# Patient Record
Sex: FEMALE | Race: WHITE | NOT HISPANIC OR LATINO | Employment: FULL TIME | URBAN - METROPOLITAN AREA
[De-identification: names, ages, dates, MRNs, and addresses within clinical notes are randomized per-mention and may not be internally consistent; named-entity substitution may affect disease eponyms.]

---

## 2022-07-28 PROCEDURE — 87086 URINE CULTURE/COLONY COUNT: CPT | Performed by: FAMILY MEDICINE

## 2023-07-19 PROBLEM — M62.81 MUSCLE WEAKNESS: Status: ACTIVE | Noted: 2020-01-17

## 2023-07-19 PROBLEM — R29.3 ABNORMAL POSTURE: Status: ACTIVE | Noted: 2020-01-17

## 2023-07-19 PROBLEM — M47.816 LUMBAR SPONDYLOSIS: Status: ACTIVE | Noted: 2020-01-17

## 2023-07-19 PROBLEM — M54.50 LOW BACK PAIN: Status: ACTIVE | Noted: 2020-01-17

## 2023-07-19 NOTE — H&P (VIEW-ONLY)
New Patient Consultation     Patient Name: Gladis Noriega  : 1980   MRN: [id number]     Chief Complaint:    Chief Complaint   Patient presents with    ERCP CONSULT       History of Present Illness: Gladis Noriega is a 42 y.o. female who is here today with past medical history of obesity, hypothyroidism, hypertension, lumbar spondylosis for a Gastroenterology Consultation for obstructed bile duct    Had MRCP on 23-common bile duct dilation with strong suggestion of stone at the lower end with ERCP recommended.     She denies any abdominal pain. No jaundice.      There are elevated liver enzymes with fatty liver seen on imaging which she reports have improved on re-check.     Her abdominal surgical hx includes fernando (16 years ago) and 2 c sections  Subjective      Review of Systems:   Review of Systems   Constitutional:  Negative for appetite change and unexpected weight loss.   HENT:  Negative for trouble swallowing.    Gastrointestinal:  Negative for abdominal distention, abdominal pain, anal bleeding, blood in stool, constipation, diarrhea, nausea, rectal pain, vomiting, GERD and indigestion.     Past Medical History:   Past Medical History:   Diagnosis Date    Disease of thyroid gland     Hyperlipidemia     Hypertension        Past Surgical History:   Past Surgical History:   Procedure Laterality Date     SECTION      GALLBLADDER SURGERY         Family History: History reviewed. No pertinent family history.    Social History:   Social History     Socioeconomic History    Marital status:    Tobacco Use    Smoking status: Never    Smokeless tobacco: Never   Vaping Use    Vaping Use: Never used   Substance and Sexual Activity    Alcohol use: Never    Drug use: Defer    Sexual activity: Defer       Alcohol/Tobacco History:   Social History     Substance and Sexual Activity   Alcohol Use Never     Social History     Tobacco Use   Smoking Status Never   Smokeless Tobacco Never        Medications:     Current Outpatient Medications:     fluticasone (FLONASE) 50 MCG/ACT nasal spray, Flonase Allergy Relief 50 mcg/actuation nasal spray,suspension  2 squirts each nostril Daily, Disp: , Rfl:     folic acid (FOLVITE) 0.5 MG half tablet, folic acid  2mg daily, Disp: , Rfl:     folic acid (FOLVITE) 1 MG tablet, Take 2 tablets by mouth Daily., Disp: 180 tablet, Rfl: 3    folic acid (FOLVITE) 1 MG tablet, Take 2 tablets by mouth daily, Disp: 180 tablet, Rfl: 3    hydrOXYzine (ATARAX) 25 MG tablet, Every 8 (Eight) Hours., Disp: , Rfl:     levothyroxine (Synthroid) 175 MCG tablet, Take 1 tablet by mouth Daily., Disp: 90 tablet, Rfl: 0    levothyroxine (Synthroid) 175 MCG tablet, Take 1 tablet by mouth Daily., Disp: 90 tablet, Rfl: 1    levothyroxine (Synthroid) 175 MCG tablet, Take 1 tablet by mouth daily., Disp: 90 tablet, Rfl: 1    losartan-hydrochlorothiazide (HYZAAR) 100-25 MG per tablet, losartan 100 mg-hydrochlorothiazide 25 mg tablet  TAKE 1 TABLET DAILY, Disp: , Rfl:     losartan-hydrochlorothiazide (HYZAAR) 100-25 MG per tablet, Take 1 tablet by mouth Daily., Disp: 90 tablet, Rfl: 1    losartan-hydrochlorothiazide (HYZAAR) 100-25 MG per tablet, Take 1 tablet by mouth Daily., Disp: 90 tablet, Rfl: 1    losartan-hydrochlorothiazide (HYZAAR) 100-25 MG per tablet, Take 1 tablet by mouth Daily., Disp: 90 tablet, Rfl: 1    medroxyPROGESTERone (PROVERA) 10 MG tablet, medroxyprogesterone 10 mg tablet  Take 1 tabled daily on days 1-10 of the month every 3 months, Disp: , Rfl:     rosuvastatin (CRESTOR) 20 MG tablet, Take 1 tablet by mouth Daily., Disp: 90 tablet, Rfl: 0    rosuvastatin (CRESTOR) 20 MG tablet, Take 1 tablet by mouth Daily., Disp: 90 tablet, Rfl: 1    Semaglutide-Weight Management (Wegovy) 0.25 MG/0.5ML solution auto-injector, Inject 0.25 mg under the skin into the appropriate area as directed once weekly, Disp: 2 mL, Rfl: 0    Semaglutide-Weight Management (Wegovy) 0.5 MG/0.5ML solution  "auto-injector, Inject 0.5 mL (0.5mg) under the skin into the appropriate area as directed Every 7 (Seven) Days., Disp: 2 mL, Rfl: 0    Semaglutide-Weight Management (Wegovy) 1 MG/0.5ML solution auto-injector, Inject 0.5 mL under the skin into the appropriate area as directed 1 (One) Time Per Week., Disp: 2 mL, Rfl: 0    Topiramate ER (Trokendi XR) 200 MG capsule sustained-release 24 hr, Take  by mouth., Disp: , Rfl:     Topiramate ER (Trokendi XR) 200 MG capsule sustained-release 24 hr, Take 1 capsule by mouth Every Evening at bedtime., Disp: 90 capsule, Rfl: 0    Topiramate ER (Trokendi XR) 200 MG capsule sustained-release 24 hr, Take 1 capsule by mouth at bedtime., Disp: 90 capsule, Rfl: 0    Topiramate ER (Trokendi XR) 200 MG capsule sustained-release 24 hr, Take 1 capsule by mouth every night at bedtime, Disp: 90 capsule, Rfl: 3    Topiramate  MG capsule sustained-release 24 hr, Take 1 capsule by mouth every night at bedtime., Disp: 90 capsule, Rfl: 3    methylPREDNISolone (Medrol) 4 MG dose pack, Follow package directions. (Patient not taking: Reported on 7/19/2023), Disp: 21 each, Rfl: 0    nitrofurantoin, macrocrystal-monohydrate, (MACROBID) 100 MG capsule, Take 1 capsule by mouth 2 (Two) Times a Day. (Patient not taking: Reported on 7/19/2023), Disp: 10 capsule, Rfl: 0    rosuvastatin (CRESTOR) 10 MG tablet, rosuvastatin 10 mg tablet  Take 1 tablet every day by oral route for 90 days. (Patient not taking: Reported on 7/19/2023), Disp: , Rfl:     Allergies:   Allergies   Allergen Reactions    Codeine Nausea Only    Lisinopril Cough       Objective     Physical Exam:  Vital Signs:   Vitals:    07/19/23 1139   BP: 124/76   BP Location: Left arm   Patient Position: Sitting   Cuff Size: Large Adult   Pulse: 77   Temp: 98 øF (36.7 øC)   TempSrc: Temporal   SpO2: 99%   Weight: 103 kg (227 lb 6.4 oz)   Height: 162.6 cm (64\")     Body mass index is 39.03 kg/mý.     Physical Exam  Vitals and nursing note " reviewed.   Constitutional:       General: She is not in acute distress.     Appearance: She is well-developed. She is not diaphoretic.   Eyes:      General: No scleral icterus.     Conjunctiva/sclera: Conjunctivae normal.   Neck:      Thyroid: No thyromegaly.   Cardiovascular:      Rate and Rhythm: Normal rate and regular rhythm.   Pulmonary:      Effort: Pulmonary effort is normal.      Breath sounds: Normal breath sounds.   Abdominal:      General: Bowel sounds are normal. There is no distension.      Palpations: Abdomen is soft.      Tenderness: There is no abdominal tenderness. There is no guarding or rebound.      Hernia: No hernia is present.   Musculoskeletal:      Cervical back: Neck supple.      Right lower leg: No edema.      Left lower leg: No edema.   Skin:     General: Skin is warm and dry.      Capillary Refill: Capillary refill takes 2 to 3 seconds.      Coloration: Skin is not jaundiced or pale.      Findings: No bruising or petechiae.      Nails: There is no clubbing.   Neurological:      Mental Status: She is alert and oriented to person, place, and time.   Psychiatric:         Behavior: Behavior normal.         Thought Content: Thought content normal.         Judgment: Judgment normal.     Reviewed from prior office note, labs, imaging  Assessment / Plan      Assessment/Plan:   Diagnoses and all orders for this visit:    1. Bile duct obstruction (Primary)  -     Protime-INR; Future  -     Comprehensive Metabolic Panel; Future  -     CBC & Differential; Future  -     Ambulatory referral for Screening EGD  Recommend ERCP.  Procedure discussed, discussed risk of pancreatitis.  2. Fatty liver  -     Protime-INR; Future  -     Comprehensive Metabolic Panel; Future  -     CBC & Differential; Future  Recommend heart healthy diet and regular exercise.  Discussed benefits of black coffee       Follow Up:   Return if symptoms worsen or fail to improve.    Plan of care reviewed with the patient at the  conclusion of today's visit.  Education was provided regarding diagnosis, management, and any prescribed or recommended OTC medications.  Patient verbalized understanding of and agreement with management plan.         LEROY Cool  [facility]

## 2023-07-20 ENCOUNTER — PREP FOR SURGERY (OUTPATIENT)
Dept: OTHER | Facility: HOSPITAL | Age: 43
End: 2023-07-20

## 2023-07-20 DIAGNOSIS — K83.1 BILE DUCT OBSTRUCTION: Primary | ICD-10-CM

## 2023-07-21 PROBLEM — K83.1 BILE DUCT OBSTRUCTION: Status: ACTIVE | Noted: 2023-07-21

## 2023-07-28 ENCOUNTER — PRE-ADMISSION TESTING (OUTPATIENT)
Dept: PREADMISSION TESTING | Facility: HOSPITAL | Age: 43
End: 2023-07-28
Payer: COMMERCIAL

## 2023-07-28 VITALS — WEIGHT: 232.25 LBS | HEIGHT: 64 IN | BODY MASS INDEX: 39.65 KG/M2

## 2023-07-28 DIAGNOSIS — K76.0 FATTY LIVER: ICD-10-CM

## 2023-07-28 DIAGNOSIS — K83.1 BILE DUCT OBSTRUCTION: ICD-10-CM

## 2023-07-28 LAB
ALBUMIN SERPL-MCNC: 4.9 G/DL (ref 3.5–5.2)
ALBUMIN/GLOB SERPL: 1.6 G/DL
ALP SERPL-CCNC: 68 U/L (ref 39–117)
ALT SERPL W P-5'-P-CCNC: 20 U/L (ref 1–33)
ANION GAP SERPL CALCULATED.3IONS-SCNC: 13 MMOL/L (ref 5–15)
AST SERPL-CCNC: 17 U/L (ref 1–32)
BASOPHILS # BLD AUTO: 0.06 10*3/MM3 (ref 0–0.2)
BASOPHILS NFR BLD AUTO: 0.7 % (ref 0–1.5)
BILIRUB SERPL-MCNC: 0.3 MG/DL (ref 0–1.2)
BUN SERPL-MCNC: 14 MG/DL (ref 6–20)
BUN/CREAT SERPL: 24.1 (ref 7–25)
CALCIUM SPEC-SCNC: 9.8 MG/DL (ref 8.6–10.5)
CHLORIDE SERPL-SCNC: 102 MMOL/L (ref 98–107)
CO2 SERPL-SCNC: 24 MMOL/L (ref 22–29)
CREAT SERPL-MCNC: 0.58 MG/DL (ref 0.57–1)
DEPRECATED RDW RBC AUTO: 47.5 FL (ref 37–54)
EGFRCR SERPLBLD CKD-EPI 2021: 114.6 ML/MIN/1.73
EOSINOPHIL # BLD AUTO: 0.32 10*3/MM3 (ref 0–0.4)
EOSINOPHIL NFR BLD AUTO: 3.7 % (ref 0.3–6.2)
ERYTHROCYTE [DISTWIDTH] IN BLOOD BY AUTOMATED COUNT: 13.5 % (ref 12.3–15.4)
GLOBULIN UR ELPH-MCNC: 3 GM/DL
GLUCOSE SERPL-MCNC: 93 MG/DL (ref 65–99)
HCT VFR BLD AUTO: 41.1 % (ref 34–46.6)
HGB BLD-MCNC: 13 G/DL (ref 12–15.9)
IMM GRANULOCYTES # BLD AUTO: 0.05 10*3/MM3 (ref 0–0.05)
IMM GRANULOCYTES NFR BLD AUTO: 0.6 % (ref 0–0.5)
INR PPP: 1.01 (ref 0.89–1.12)
LYMPHOCYTES # BLD AUTO: 2.8 10*3/MM3 (ref 0.7–3.1)
LYMPHOCYTES NFR BLD AUTO: 32 % (ref 19.6–45.3)
MCH RBC QN AUTO: 30.2 PG (ref 26.6–33)
MCHC RBC AUTO-ENTMCNC: 31.6 G/DL (ref 31.5–35.7)
MCV RBC AUTO: 95.6 FL (ref 79–97)
MONOCYTES # BLD AUTO: 0.72 10*3/MM3 (ref 0.1–0.9)
MONOCYTES NFR BLD AUTO: 8.2 % (ref 5–12)
NEUTROPHILS NFR BLD AUTO: 4.81 10*3/MM3 (ref 1.7–7)
NEUTROPHILS NFR BLD AUTO: 54.8 % (ref 42.7–76)
NRBC BLD AUTO-RTO: 0 /100 WBC (ref 0–0.2)
PLATELET # BLD AUTO: 407 10*3/MM3 (ref 140–450)
PMV BLD AUTO: 9.4 FL (ref 6–12)
POTASSIUM SERPL-SCNC: 3.7 MMOL/L (ref 3.5–5.2)
PROT SERPL-MCNC: 7.9 G/DL (ref 6–8.5)
PROTHROMBIN TIME: 13.4 SECONDS (ref 12.2–14.5)
RBC # BLD AUTO: 4.3 10*6/MM3 (ref 3.77–5.28)
SODIUM SERPL-SCNC: 139 MMOL/L (ref 136–145)
WBC NRBC COR # BLD: 8.76 10*3/MM3 (ref 3.4–10.8)

## 2023-07-28 PROCEDURE — 93005 ELECTROCARDIOGRAM TRACING: CPT

## 2023-07-28 PROCEDURE — 36415 COLL VENOUS BLD VENIPUNCTURE: CPT

## 2023-07-28 PROCEDURE — 85610 PROTHROMBIN TIME: CPT

## 2023-07-28 PROCEDURE — 80053 COMPREHEN METABOLIC PANEL: CPT

## 2023-07-28 PROCEDURE — 85025 COMPLETE CBC W/AUTO DIFF WBC: CPT

## 2023-07-28 RX ORDER — LORATADINE 10 MG/1
1 CAPSULE, LIQUID FILLED ORAL DAILY
COMMUNITY

## 2023-07-31 ENCOUNTER — ANESTHESIA EVENT (OUTPATIENT)
Dept: GASTROENTEROLOGY | Facility: HOSPITAL | Age: 43
End: 2023-07-31
Payer: COMMERCIAL

## 2023-07-31 LAB
QT INTERVAL: 412 MS
QTC INTERVAL: 412 MS

## 2023-08-01 ENCOUNTER — HOSPITAL ENCOUNTER (OUTPATIENT)
Facility: HOSPITAL | Age: 43
Setting detail: HOSPITAL OUTPATIENT SURGERY
Discharge: HOME | End: 2023-08-01
Attending: INTERNAL MEDICINE | Admitting: INTERNAL MEDICINE
Payer: COMMERCIAL

## 2023-08-01 ENCOUNTER — APPOINTMENT (OUTPATIENT)
Dept: GENERAL RADIOLOGY | Facility: HOSPITAL | Age: 43
End: 2023-08-01
Payer: COMMERCIAL

## 2023-08-01 ENCOUNTER — ANESTHESIA (OUTPATIENT)
Dept: GASTROENTEROLOGY | Facility: HOSPITAL | Age: 43
End: 2023-08-01
Payer: COMMERCIAL

## 2023-08-01 VITALS
SYSTOLIC BLOOD PRESSURE: 134 MMHG | OXYGEN SATURATION: 99 % | TEMPERATURE: 97.2 F | RESPIRATION RATE: 16 BRPM | DIASTOLIC BLOOD PRESSURE: 86 MMHG | HEART RATE: 67 BPM

## 2023-08-01 DIAGNOSIS — K83.1 BILE DUCT OBSTRUCTION: ICD-10-CM

## 2023-08-01 PROCEDURE — 25010000002 FENTANYL CITRATE (PF) 100 MCG/2ML SOLUTION

## 2023-08-01 PROCEDURE — 25010000002 SUGAMMADEX 500 MG/5ML SOLUTION

## 2023-08-01 PROCEDURE — 25010000002 PROPOFOL 10 MG/ML EMULSION

## 2023-08-01 PROCEDURE — C1769 GUIDE WIRE: HCPCS | Performed by: INTERNAL MEDICINE

## 2023-08-01 PROCEDURE — 25010000002 ONDANSETRON PER 1 MG

## 2023-08-01 PROCEDURE — 25010000002 DEXAMETHASONE PER 1 MG

## 2023-08-01 PROCEDURE — 74330 X-RAY BILE/PANC ENDOSCOPY: CPT

## 2023-08-01 PROCEDURE — 43264 ERCP REMOVE DUCT CALCULI: CPT | Performed by: INTERNAL MEDICINE

## 2023-08-01 PROCEDURE — 25010000002 GLUCAGON (HUMAN RECOMBINANT) 1 MG RECONSTITUTED SOLUTION

## 2023-08-01 PROCEDURE — 74328 X-RAY BILE DUCT ENDOSCOPY: CPT | Performed by: INTERNAL MEDICINE

## 2023-08-01 PROCEDURE — 43262 ENDO CHOLANGIOPANCREATOGRAPH: CPT | Performed by: INTERNAL MEDICINE

## 2023-08-01 RX ORDER — MIDAZOLAM HYDROCHLORIDE 1 MG/ML
1 INJECTION INTRAMUSCULAR; INTRAVENOUS
Status: DISCONTINUED | OUTPATIENT
Start: 2023-08-01 | End: 2023-08-01

## 2023-08-01 RX ORDER — FAMOTIDINE 20 MG/1
20 TABLET, FILM COATED ORAL ONCE
Status: DISCONTINUED | OUTPATIENT
Start: 2023-08-01 | End: 2023-08-01

## 2023-08-01 RX ORDER — SODIUM CHLORIDE, SODIUM LACTATE, POTASSIUM CHLORIDE, CALCIUM CHLORIDE 600; 310; 30; 20 MG/100ML; MG/100ML; MG/100ML; MG/100ML
9 INJECTION, SOLUTION INTRAVENOUS CONTINUOUS
Status: DISCONTINUED | OUTPATIENT
Start: 2023-08-01 | End: 2023-08-01 | Stop reason: HOSPADM

## 2023-08-01 RX ORDER — ONDANSETRON 2 MG/ML
INJECTION INTRAMUSCULAR; INTRAVENOUS AS NEEDED
Status: DISCONTINUED | OUTPATIENT
Start: 2023-08-01 | End: 2023-08-01 | Stop reason: SURG

## 2023-08-01 RX ORDER — LIDOCAINE HYDROCHLORIDE 10 MG/ML
0.5 INJECTION, SOLUTION EPIDURAL; INFILTRATION; INTRACAUDAL; PERINEURAL ONCE AS NEEDED
Status: DISCONTINUED | OUTPATIENT
Start: 2023-08-01 | End: 2023-08-01

## 2023-08-01 RX ORDER — LIDOCAINE HYDROCHLORIDE 10 MG/ML
INJECTION, SOLUTION EPIDURAL; INFILTRATION; INTRACAUDAL; PERINEURAL AS NEEDED
Status: DISCONTINUED | OUTPATIENT
Start: 2023-08-01 | End: 2023-08-01 | Stop reason: SURG

## 2023-08-01 RX ORDER — FENTANYL CITRATE 50 UG/ML
INJECTION, SOLUTION INTRAMUSCULAR; INTRAVENOUS AS NEEDED
Status: DISCONTINUED | OUTPATIENT
Start: 2023-08-01 | End: 2023-08-01 | Stop reason: SURG

## 2023-08-01 RX ORDER — SODIUM CHLORIDE 0.9 % (FLUSH) 0.9 %
10 SYRINGE (ML) INJECTION AS NEEDED
Status: DISCONTINUED | OUTPATIENT
Start: 2023-08-01 | End: 2023-08-01 | Stop reason: HOSPADM

## 2023-08-01 RX ORDER — ONDANSETRON 2 MG/ML
4 INJECTION INTRAMUSCULAR; INTRAVENOUS ONCE AS NEEDED
Status: DISCONTINUED | OUTPATIENT
Start: 2023-08-01 | End: 2023-08-01 | Stop reason: HOSPADM

## 2023-08-01 RX ORDER — FAMOTIDINE 10 MG/ML
20 INJECTION, SOLUTION INTRAVENOUS ONCE
Status: COMPLETED | OUTPATIENT
Start: 2023-08-01 | End: 2023-08-01

## 2023-08-01 RX ORDER — PROPOFOL 10 MG/ML
VIAL (ML) INTRAVENOUS AS NEEDED
Status: DISCONTINUED | OUTPATIENT
Start: 2023-08-01 | End: 2023-08-01 | Stop reason: SURG

## 2023-08-01 RX ORDER — IPRATROPIUM BROMIDE AND ALBUTEROL SULFATE 2.5; .5 MG/3ML; MG/3ML
3 SOLUTION RESPIRATORY (INHALATION) ONCE AS NEEDED
Status: DISCONTINUED | OUTPATIENT
Start: 2023-08-01 | End: 2023-08-01 | Stop reason: HOSPADM

## 2023-08-01 RX ORDER — ROCURONIUM BROMIDE 10 MG/ML
INJECTION, SOLUTION INTRAVENOUS AS NEEDED
Status: DISCONTINUED | OUTPATIENT
Start: 2023-08-01 | End: 2023-08-01 | Stop reason: SURG

## 2023-08-01 RX ORDER — SODIUM CHLORIDE 0.9 % (FLUSH) 0.9 %
10 SYRINGE (ML) INJECTION EVERY 12 HOURS SCHEDULED
Status: DISCONTINUED | OUTPATIENT
Start: 2023-08-01 | End: 2023-08-01 | Stop reason: HOSPADM

## 2023-08-01 RX ORDER — DEXAMETHASONE SODIUM PHOSPHATE 4 MG/ML
INJECTION, SOLUTION INTRA-ARTICULAR; INTRALESIONAL; INTRAMUSCULAR; INTRAVENOUS; SOFT TISSUE AS NEEDED
Status: DISCONTINUED | OUTPATIENT
Start: 2023-08-01 | End: 2023-08-01 | Stop reason: SURG

## 2023-08-01 RX ORDER — SODIUM CHLORIDE 9 MG/ML
40 INJECTION, SOLUTION INTRAVENOUS AS NEEDED
Status: DISCONTINUED | OUTPATIENT
Start: 2023-08-01 | End: 2023-08-01 | Stop reason: HOSPADM

## 2023-08-01 RX ADMIN — FENTANYL CITRATE 100 MCG: 50 INJECTION, SOLUTION INTRAMUSCULAR; INTRAVENOUS at 12:24

## 2023-08-01 RX ADMIN — LIDOCAINE HYDROCHLORIDE 50 MG: 10 INJECTION, SOLUTION EPIDURAL; INFILTRATION; INTRACAUDAL; PERINEURAL at 12:24

## 2023-08-01 RX ADMIN — DEXAMETHASONE SODIUM PHOSPHATE 8 MG: 4 INJECTION, SOLUTION INTRAMUSCULAR; INTRAVENOUS at 12:42

## 2023-08-01 RX ADMIN — FAMOTIDINE 20 MG: 10 INJECTION INTRAVENOUS at 11:38

## 2023-08-01 RX ADMIN — ROCURONIUM BROMIDE 50 MG: 10 SOLUTION INTRAVENOUS at 12:24

## 2023-08-01 RX ADMIN — ONDANSETRON 4 MG: 2 INJECTION INTRAMUSCULAR; INTRAVENOUS at 12:52

## 2023-08-01 RX ADMIN — Medication 10 ML: at 11:36

## 2023-08-01 RX ADMIN — GLUCAGON HYDROCHLORIDE 0.5 MG: KIT at 12:47

## 2023-08-01 RX ADMIN — SUGAMMADEX 300 MG: 100 INJECTION, SOLUTION INTRAVENOUS at 12:53

## 2023-08-01 RX ADMIN — SODIUM CHLORIDE, POTASSIUM CHLORIDE, SODIUM LACTATE AND CALCIUM CHLORIDE 9 ML/HR: 600; 310; 30; 20 INJECTION, SOLUTION INTRAVENOUS at 11:36

## 2023-08-01 RX ADMIN — PROPOFOL 200 MG: 10 INJECTION, EMULSION INTRAVENOUS at 12:24

## 2023-08-01 NOTE — INTERVAL H&P NOTE
H&P reviewed. The patient was examined and there are no changes to the H&P.      Patient with h/o fatty liver s/p lap ccy with u/s obtained showing dilated bile duct. F/u mrcp with strong suggestion of choledocholithiasis. To ercp. Patient and  aware of p'itis risk.    We discussed the risk of the procedure including the risk of pancreatitis (roughly 4/100 with 100 mg pr indocin for obstructive etiology), bowel perforation (~1/1000), hemorrhage (~1/200), infection (1~1-300) and death (severe complication of any of the above).  After discussing the benefit and risk, the patient and I determined to proceed with the procedure.

## 2024-05-17 RX ORDER — SODIUM, POTASSIUM,MAG SULFATES 17.5-3.13G
1 SOLUTION, RECONSTITUTED, ORAL ORAL EVERY 12 HOURS
Qty: 354 ML | Refills: 0 | Status: SHIPPED | OUTPATIENT
Start: 2024-05-17

## 2024-05-24 ENCOUNTER — OUTSIDE FACILITY SERVICE (OUTPATIENT)
Dept: GASTROENTEROLOGY | Facility: CLINIC | Age: 44
End: 2024-05-24
Payer: COMMERCIAL

## 2024-05-24 PROCEDURE — 88305 TISSUE EXAM BY PATHOLOGIST: CPT | Performed by: INTERNAL MEDICINE

## 2024-05-24 PROCEDURE — 45385 COLONOSCOPY W/LESION REMOVAL: CPT | Performed by: INTERNAL MEDICINE

## 2024-05-24 PROCEDURE — 45380 COLONOSCOPY AND BIOPSY: CPT | Performed by: INTERNAL MEDICINE

## 2024-05-28 ENCOUNTER — LAB REQUISITION (OUTPATIENT)
Dept: LAB | Facility: HOSPITAL | Age: 44
End: 2024-05-28
Payer: COMMERCIAL

## 2024-05-28 DIAGNOSIS — D12.5 BENIGN NEOPLASM OF SIGMOID COLON: ICD-10-CM

## 2024-05-28 DIAGNOSIS — D12.8 BENIGN NEOPLASM OF RECTUM: ICD-10-CM

## 2024-05-28 DIAGNOSIS — Z12.11 ENCOUNTER FOR SCREENING FOR MALIGNANT NEOPLASM OF COLON: ICD-10-CM

## 2024-05-30 LAB — REF LAB TEST METHOD: NORMAL

## 2024-08-15 ENCOUNTER — OFFICE VISIT (OUTPATIENT)
Dept: SLEEP MEDICINE | Facility: CLINIC | Age: 44
End: 2024-08-15
Payer: COMMERCIAL

## 2024-08-15 VITALS
HEIGHT: 64 IN | TEMPERATURE: 98 F | SYSTOLIC BLOOD PRESSURE: 132 MMHG | OXYGEN SATURATION: 98 % | HEART RATE: 78 BPM | BODY MASS INDEX: 41.83 KG/M2 | DIASTOLIC BLOOD PRESSURE: 84 MMHG | WEIGHT: 245 LBS

## 2024-08-15 DIAGNOSIS — E66.01 CLASS 3 SEVERE OBESITY WITHOUT SERIOUS COMORBIDITY WITH BODY MASS INDEX (BMI) OF 40.0 TO 44.9 IN ADULT, UNSPECIFIED OBESITY TYPE: ICD-10-CM

## 2024-08-15 DIAGNOSIS — R06.83 SNORING: Primary | ICD-10-CM

## 2024-08-15 PROCEDURE — 99204 OFFICE O/P NEW MOD 45 MIN: CPT | Performed by: INTERNAL MEDICINE

## 2024-08-15 RX ORDER — LANOLIN ALCOHOL/MO/W.PET/CERES
3 CREAM (GRAM) TOPICAL
COMMUNITY

## 2024-08-15 NOTE — PROGRESS NOTES
New Sleep Patient Office Visit      Patient Name: Gladis Noriega    Referring Physician: Susannah Hankins PA    Chief Complaint:    Chief Complaint   Patient presents with    Sleeping Problem    Snoring    Fatigue       History of Present Illness: Gladis Noriega is a 43 y.o. female who is here today to establish care with Sleep Medicine.     Pleasant 43-year-old female with hypothyroidism, dyslipidemia, hypertension and pseudotumor cerebri referred here for evaluation of sleep problems.  Patient states that she has a history of snoring which apparently used to be loud but improved to some degree as her family does not complain about it that much.  She also has been told that she occasionally may stop breathing but does not feel rested when she wakes up in the morning.  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  When she is off work she sleeps 8 to 10 hours a night and occasionally will take a nap during daytime as well.  During those days she feels slightly better in terms of daytime energy.  She denies any headaches in the morning.  Denies any significant dryness of the mouth.  Denies any restless leg symptoms.  Denies any other parasomnias.  No REM behavior disorder.  She does take melatonin to go to sleep and that seems to be working well for her.  She was given Atarax but she rarely uses that.  Denies any driving problems.  Denies any sleep-related accidents at work or on the road.  Denies any smoking or alcohol use or illicit drug use.  Drinks 1 cup of coffee a day.    Further sleep history is as below.    Fairview Scale: 6/24    Estimated average amount of sleep per night: 5-6 when working, 8-10 when not working  Number of times  she wakes up at night: 1  Difficulty falling back asleep: no  It usually takes 30-45 minutes to go to sleep.  She feels sleepy upon waking up: no  Rotating or night shift work:  no    Drowsiness/Sleepiness:  She exhibits the following:  excessive daytime fatigue    Snoring/Breathing:  She exhibits the following:  loud snoring  snores in all sleep positions  awoken with dry mouth    Reflux:  She describes the following:  NA    Narcolepsy:  She exhibits the following:  none    RLS/PLMs:  She describes the following:  Occasional when she is working day shift    Insomnia:  She describes the following:  NA    Parasomnia:  She exhibits the following:  excessive sweating while sleeping    Weight:  Weight change in the last year:  Stable    Subjective      Review of Systems:   Review of Systems   Constitutional: Negative.    HENT:  Positive for congestion.    Eyes:  Positive for itching.   Respiratory: Negative.     Cardiovascular: Negative.    Gastrointestinal: Negative.    Endocrine: Negative.    Musculoskeletal: Negative.    Skin: Negative.    Neurological: Negative.    Hematological: Negative.    Psychiatric/Behavioral: Negative.     All other systems reviewed and are negative.        Past Medical History:   Past Medical History:   Diagnosis Date    Disease of thyroid gland     Gallstone     Hyperlipidemia     Hypertension     Pseudotumor        Past Surgical History:   Past Surgical History:   Procedure Laterality Date     SECTION      ERCP N/A 2023    Procedure: ENDOSCOPIC RETROGRADE CHOLANGIOPANCREATOGRAPHY;  Surgeon: Tristan Shi MD;  Location: [facility] ENDOSCOPY;  Service: Gastroenterology;  Laterality: N/A;    GALLBLADDER SURGERY         Family History:   Family History   Problem Relation Age of Onset    Chronic bronchitis Mother     Diabetes Maternal Aunt     Hypertension Maternal Uncle     Stroke Maternal Uncle     Cancer Maternal Grandmother        Social History:   Social History     Socioeconomic History    Marital status:    Tobacco Use    Smoking status: Never    Smokeless tobacco: Never   Vaping Use    Vaping status: Never Used   Substance and Sexual  Activity    Alcohol use: Never    Drug use: Defer    Sexual activity: Defer       Medications:     Current Outpatient Medications:     fluticasone (FLONASE) 50 MCG/ACT nasal spray, 2 sprays into the nostril(s) as directed by provider Daily., Disp: , Rfl:     folic acid (FOLVITE) 1 MG tablet, Take 2 tablets by mouth Daily., Disp: 180 tablet, Rfl: 3    hydrOXYzine (ATARAX) 25 MG tablet, Take 1 tablet by mouth Daily As Needed., Disp: , Rfl:     levothyroxine (Euthyrox) 175 MCG tablet, Take 1 tablet by mouth Daily., Disp: 90 tablet, Rfl: 1    Loratadine 10 MG capsule, Take 1 capsule by mouth Daily., Disp: , Rfl:     losartan-hydrochlorothiazide (HYZAAR) 100-25 MG per tablet, Take 1 tablet by mouth Daily., Disp: 90 tablet, Rfl: 1    medroxyPROGESTERone (PROVERA) 10 MG tablet, medroxyprogesterone 10 mg tablet  Take 1 tabled daily on days 1-10 of the month every 3 months, Disp: , Rfl:     melatonin 3 MG tablet, Take 1 tablet by mouth., Disp: , Rfl:     rosuvastatin (CRESTOR) 20 MG tablet, Take 1 tablet by mouth Daily for 90 days., Disp: 90 tablet, Rfl: 1    sodium-potassium-magnesium sulfates (Suprep Bowel Prep Kit) 17.5-3.13-1.6 GM/177ML solution oral solution, Take 1 bottle by mouth Every 12 (Twelve) Hours as directed (Patient not taking: Reported on 8/15/2024), Disp: 354 mL, Rfl: 0    Topiramate  MG capsule sustained-release 24 hr, Take 1 capsule by mouth every night at bedtime., Disp: 90 capsule, Rfl: 0    Topiramate ER 50 MG capsule sustained-release 24 hr, Take 1 capsule by mouth Daily with the 200 mg capsule for a total daily dose of 250 mg daily, Disp: 90 capsule, Rfl: 3    Allergies:   Allergies   Allergen Reactions    Codeine Nausea Only    Lisinopril Cough       Objective     Physical Exam:  Vital Signs:   Vitals:    08/15/24 1213   BP: 132/84   BP Location: Left arm   Patient Position: Sitting   Cuff Size: Adult   Pulse: 78   Temp: 98 °F (36.7 °C)   SpO2: 98%   Weight: 111 kg (245 lb)   Height: 162.6 cm  "(64\")     Body mass index is 42.05 kg/m².    Physical Exam  Vitals and nursing note reviewed.   Constitutional:       General: She is not in acute distress.     Appearance: She is well-developed. She is not diaphoretic.   HENT:      Head: Normocephalic and atraumatic.      Comments: Mallampati 3 airway, 1+ tonsils, crowded posterior airway     Nose: Nose normal.   Eyes:      General:         Right eye: No discharge.         Left eye: No discharge.      Pupils: Pupils are equal, round, and reactive to light.   Neck:      Thyroid: No thyromegaly.      Trachea: No tracheal deviation.   Cardiovascular:      Rate and Rhythm: Normal rate and regular rhythm.      Heart sounds: Normal heart sounds. No murmur heard.     No friction rub. No gallop.   Pulmonary:      Effort: Pulmonary effort is normal. No respiratory distress.      Breath sounds: Normal breath sounds. No wheezing or rales.   Musculoskeletal:         General: No tenderness.      Cervical back: Neck supple.      Right lower leg: No edema.      Left lower leg: No edema.   Neurological:      Mental Status: She is alert and oriented to person, place, and time.   Psychiatric:         Behavior: Behavior normal.         Thought Content: Thought content normal.         Judgment: Judgment normal.         Results Review:   I reviewed the patient's new clinical results.  No results found for: \"TSH\"    TSH normal range per patient with outside labs.      Assessment / Plan      Assessment:   Problem List Items Addressed This Visit          Endocrine and Metabolic    Obesity     Other Visit Diagnoses       Snoring    -  Primary    Relevant Orders    Home Sleep Study              Plan:   1.  Patient presenting with complains of snoring, nonrestorative sleep, Mallampati 3 airway and class III obesity. All this put her at high risk of sleep disordered breathing.  Discussed at length about pathophysiology of sleep apnea.  Discussed side effects of untreated sleep apnea including " poor quality sleep, cardiovascular, neurologic and metabolic side effects also discussed.  Further diagnostic testing recommended.  Patient is amenable to proceed with further testing and treatment as needed.  We discussed home study versus in lab study.  Patient is amenable to proceeding with home-based testing after our discussion currently.  We will set her up for that and follow-up closely after.     2.  If found to have significant sleep apnea available treatment options discussed including CPAP therapy, oral appliance, surgical options.  Weight loss as a treatment option for sleep apnea also discussed and counseled.     3.  Increasing activity advised.      4.  Patient has increased need for sleep. Monitor for now.  She does not present any clinical symptoms of narcolepsy.  May be a long sleeper needing longer sleep time.  Hopefully if she has sleep apnea and may treat her sleep apnea and improve her quality of sleep may be things will improve.  It is not affecting her quality of life or work life at this point.    5.  Continue management of hypothyroidism.      Thank you for allowing me to participate in the care of this patient.  Will follow her closely.      Follow Up:   After HST    Discussed plan of care in detail with patient today. Patient verbally understands and agrees. I spent 48 minutes on this date of service. This time includes time spent by me in the following activities:preparing for the visit, reviewing tests, obtaining and/or reviewing a separately obtained history, performing a medically appropriate examination, counseling the patient, ordering tests, or procedures, and/or documenting information in the medical record. This time excludes other separate billable services such as interpretation of tests or procedures, if applicable.        Trace Nielsen MD  Pulmonary Critical Care and Sleep Medicine

## 2024-08-26 ENCOUNTER — HOSPITAL ENCOUNTER (OUTPATIENT)
Dept: SLEEP MEDICINE | Facility: HOSPITAL | Age: 44
Discharge: HOME | End: 2024-08-26
Admitting: INTERNAL MEDICINE
Payer: COMMERCIAL

## 2024-08-26 VITALS — WEIGHT: 244.71 LBS | HEIGHT: 64 IN | BODY MASS INDEX: 41.78 KG/M2

## 2024-08-26 DIAGNOSIS — R06.83 SNORING: ICD-10-CM

## 2024-08-26 PROCEDURE — 95800 SLP STDY UNATTENDED: CPT

## 2024-08-27 DIAGNOSIS — R06.83 SNORING: ICD-10-CM

## 2024-08-27 DIAGNOSIS — G47.33 OSA (OBSTRUCTIVE SLEEP APNEA): Primary | ICD-10-CM

## 2024-08-29 PROCEDURE — 95800 SLP STDY UNATTENDED: CPT | Performed by: INTERNAL MEDICINE

## (undated) DEVICE — SAFELINER SUCTION CANISTER 1000CC: Brand: DEROYAL

## (undated) DEVICE — SOLIDIFIER LIQ PREMISORB 1500CC

## (undated) DEVICE — CANNULATING SPHINCTEROTOME: Brand: JAGTOME™ REVOLUTION RX

## (undated) DEVICE — SYR LUERLOK 50ML

## (undated) DEVICE — CONTN GRAD MEAS TRIANG 32OZ BLK

## (undated) DEVICE — INTRO ACCSR BLNT TP

## (undated) DEVICE — LUBE GEL ENDOGLIDE 1.1OZ

## (undated) DEVICE — FIRST STEP BEDSIDE ADD WATER KIT - RESEALABLE STAND-UP POUCH, ENDOSCOPIC CLEANING PAD - 1 POUCH: Brand: FIRST STEP BEDSIDE ADD WATER KIT - RESEALABLE STAND-UP POUCH, ENDOSCOPIC CLEANIN

## (undated) DEVICE — KT ORCA ORCAPOD DISP STRL

## (undated) DEVICE — SOL IRR H2O BTL 1000ML STRL

## (undated) DEVICE — SPHINCTEROTOME: Brand: DREAMTOME™ RX 44

## (undated) DEVICE — RETRIEVAL BALLOON CATHETER: Brand: EXTRACTOR™ PRO RX

## (undated) DEVICE — SYR LL TP 10ML STRL

## (undated) DEVICE — SINGLE USE DISTAL COVER MAJ-2315: Brand: SINGLE USE DISTAL COVER

## (undated) DEVICE — ADAPT CLN LUM OLYMP AIR/H20

## (undated) DEVICE — ERBE NESSY®PLATE 170 SPLIT; 168CM²; CABLE 3M: Brand: ERBE

## (undated) DEVICE — THE BITE BLOCK MAXI, LATEX FREE STRAP IS USED TO PROTECT THE ENDOSCOPE INSERTION TUBE FROM BEING BITTEN BY THE PATIENT.

## (undated) DEVICE — BOWL UTIL STRL 32OZ

## (undated) DEVICE — HYBRID CO2 TUBING/CAP SET FOR OLYMPUS® SCOPES & CO2 SOURCE: Brand: ERBE

## (undated) DEVICE — DEV LK WIREGUIDE FUSN OLYMP SCP

## (undated) DEVICE — TUBING, SUCTION, 1/4" X 10', STRAIGHT: Brand: MEDLINE